# Patient Record
Sex: MALE | Race: WHITE | Employment: OTHER | URBAN - NONMETROPOLITAN AREA
[De-identification: names, ages, dates, MRNs, and addresses within clinical notes are randomized per-mention and may not be internally consistent; named-entity substitution may affect disease eponyms.]

---

## 2020-10-19 ENCOUNTER — APPOINTMENT (OUTPATIENT)
Dept: GENERAL RADIOLOGY | Facility: HOSPITAL | Age: 85
End: 2020-10-19
Attending: EMERGENCY MEDICINE
Payer: COMMERCIAL

## 2020-10-19 ENCOUNTER — HOSPITAL ENCOUNTER (OUTPATIENT)
Facility: HOSPITAL | Age: 85
Setting detail: OBSERVATION
Discharge: NURSING, REHAB OR LONG-TERM CARE | End: 2020-10-20
Attending: EMERGENCY MEDICINE | Admitting: INTERNAL MEDICINE
Payer: COMMERCIAL

## 2020-10-19 DIAGNOSIS — I48.91 ATRIAL FIBRILLATION WITH RVR (H): Primary | ICD-10-CM

## 2020-10-19 DIAGNOSIS — I47.10 SVT (SUPRAVENTRICULAR TACHYCARDIA) (H): ICD-10-CM

## 2020-10-19 LAB
ALBUMIN SERPL-MCNC: 3.4 G/DL (ref 3.4–5)
ALBUMIN UR-MCNC: NEGATIVE MG/DL
ALP SERPL-CCNC: 67 U/L (ref 40–150)
ALT SERPL W P-5'-P-CCNC: 12 U/L (ref 0–70)
ANION GAP SERPL CALCULATED.3IONS-SCNC: 5 MMOL/L (ref 3–14)
APPEARANCE UR: CLEAR
AST SERPL W P-5'-P-CCNC: 15 U/L (ref 0–45)
BASOPHILS # BLD AUTO: 0 10E9/L (ref 0–0.2)
BASOPHILS NFR BLD AUTO: 0.3 %
BILIRUB SERPL-MCNC: 0.4 MG/DL (ref 0.2–1.3)
BILIRUB UR QL STRIP: NEGATIVE
BUN SERPL-MCNC: 36 MG/DL (ref 7–30)
CALCIUM SERPL-MCNC: 8.5 MG/DL (ref 8.5–10.1)
CHLORIDE SERPL-SCNC: 104 MMOL/L (ref 94–109)
CO2 SERPL-SCNC: 30 MMOL/L (ref 20–32)
COLOR UR AUTO: NORMAL
CREAT SERPL-MCNC: 1.58 MG/DL (ref 0.66–1.25)
D DIMER PPP FEU-MCNC: 0.5 UG/ML FEU (ref 0–0.5)
DIFFERENTIAL METHOD BLD: ABNORMAL
EOSINOPHIL # BLD AUTO: 0 10E9/L (ref 0–0.7)
EOSINOPHIL NFR BLD AUTO: 0.3 %
ERYTHROCYTE [DISTWIDTH] IN BLOOD BY AUTOMATED COUNT: 13.3 % (ref 10–15)
GFR SERPL CREATININE-BSD FRML MDRD: 37 ML/MIN/{1.73_M2}
GLUCOSE SERPL-MCNC: 109 MG/DL (ref 70–99)
GLUCOSE UR STRIP-MCNC: NEGATIVE MG/DL
HCT VFR BLD AUTO: 43.7 % (ref 40–53)
HGB BLD-MCNC: 14.3 G/DL (ref 13.3–17.7)
HGB UR QL STRIP: NEGATIVE
IMM GRANULOCYTES # BLD: 0.1 10E9/L (ref 0–0.4)
IMM GRANULOCYTES NFR BLD: 0.5 %
INR PPP: 1.08 (ref 0.86–1.14)
KETONES UR STRIP-MCNC: NEGATIVE MG/DL
LEUKOCYTE ESTERASE UR QL STRIP: NEGATIVE
LYMPHOCYTES # BLD AUTO: 1.4 10E9/L (ref 0.8–5.3)
LYMPHOCYTES NFR BLD AUTO: 11.5 %
MCH RBC QN AUTO: 29.9 PG (ref 26.5–33)
MCHC RBC AUTO-ENTMCNC: 32.7 G/DL (ref 31.5–36.5)
MCV RBC AUTO: 91 FL (ref 78–100)
MONOCYTES # BLD AUTO: 0.7 10E9/L (ref 0–1.3)
MONOCYTES NFR BLD AUTO: 5.7 %
NEUTROPHILS # BLD AUTO: 9.6 10E9/L (ref 1.6–8.3)
NEUTROPHILS NFR BLD AUTO: 81.7 %
NITRATE UR QL: NEGATIVE
NRBC # BLD AUTO: 0 10*3/UL
NRBC BLD AUTO-RTO: 0 /100
NT-PROBNP SERPL-MCNC: 3560 PG/ML (ref 0–1800)
PH UR STRIP: 5.5 PH (ref 4.7–8)
PLATELET # BLD AUTO: 310 10E9/L (ref 150–450)
POTASSIUM SERPL-SCNC: 4.1 MMOL/L (ref 3.4–5.3)
PROT SERPL-MCNC: 6.4 G/DL (ref 6.8–8.8)
RBC # BLD AUTO: 4.78 10E12/L (ref 4.4–5.9)
SODIUM SERPL-SCNC: 139 MMOL/L (ref 133–144)
SOURCE: NORMAL
SP GR UR STRIP: 1.02 (ref 1–1.03)
TROPONIN I SERPL-MCNC: <0.015 UG/L (ref 0–0.04)
TSH SERPL DL<=0.005 MIU/L-ACNC: 3.45 MU/L (ref 0.4–4)
UROBILINOGEN UR STRIP-MCNC: NORMAL MG/DL (ref 0–2)
WBC # BLD AUTO: 11.7 10E9/L (ref 4–11)

## 2020-10-19 PROCEDURE — 85379 FIBRIN DEGRADATION QUANT: CPT | Performed by: EMERGENCY MEDICINE

## 2020-10-19 PROCEDURE — 96376 TX/PRO/DX INJ SAME DRUG ADON: CPT

## 2020-10-19 PROCEDURE — 250N000013 HC RX MED GY IP 250 OP 250 PS 637: Performed by: EMERGENCY MEDICINE

## 2020-10-19 PROCEDURE — 85025 COMPLETE CBC W/AUTO DIFF WBC: CPT | Performed by: EMERGENCY MEDICINE

## 2020-10-19 PROCEDURE — 93005 ELECTROCARDIOGRAM TRACING: CPT

## 2020-10-19 PROCEDURE — 80053 COMPREHEN METABOLIC PANEL: CPT | Performed by: EMERGENCY MEDICINE

## 2020-10-19 PROCEDURE — 99284 EMERGENCY DEPT VISIT MOD MDM: CPT | Performed by: EMERGENCY MEDICINE

## 2020-10-19 PROCEDURE — 71045 X-RAY EXAM CHEST 1 VIEW: CPT

## 2020-10-19 PROCEDURE — 96365 THER/PROPH/DIAG IV INF INIT: CPT

## 2020-10-19 PROCEDURE — 250N000013 HC RX MED GY IP 250 OP 250 PS 637: Performed by: INTERNAL MEDICINE

## 2020-10-19 PROCEDURE — 250N000009 HC RX 250: Performed by: EMERGENCY MEDICINE

## 2020-10-19 PROCEDURE — U0003 INFECTIOUS AGENT DETECTION BY NUCLEIC ACID (DNA OR RNA); SEVERE ACUTE RESPIRATORY SYNDROME CORONAVIRUS 2 (SARS-COV-2) (CORONAVIRUS DISEASE [COVID-19]), AMPLIFIED PROBE TECHNIQUE, MAKING USE OF HIGH THROUGHPUT TECHNOLOGIES AS DESCRIBED BY CMS-2020-01-R: HCPCS | Performed by: EMERGENCY MEDICINE

## 2020-10-19 PROCEDURE — 36415 COLL VENOUS BLD VENIPUNCTURE: CPT | Performed by: EMERGENCY MEDICINE

## 2020-10-19 PROCEDURE — 84443 ASSAY THYROID STIM HORMONE: CPT | Performed by: EMERGENCY MEDICINE

## 2020-10-19 PROCEDURE — 99291 CRITICAL CARE FIRST HOUR: CPT | Mod: 25

## 2020-10-19 PROCEDURE — 99219 PR INITIAL OBSERVATION CARE,LEVEL II: CPT | Mod: 25 | Performed by: INTERNAL MEDICINE

## 2020-10-19 PROCEDURE — 120N000001 HC R&B MED SURG/OB

## 2020-10-19 PROCEDURE — 93010 ELECTROCARDIOGRAM REPORT: CPT | Performed by: INTERNAL MEDICINE

## 2020-10-19 PROCEDURE — 81003 URINALYSIS AUTO W/O SCOPE: CPT | Performed by: INTERNAL MEDICINE

## 2020-10-19 PROCEDURE — 83880 ASSAY OF NATRIURETIC PEPTIDE: CPT | Performed by: EMERGENCY MEDICINE

## 2020-10-19 PROCEDURE — G0378 HOSPITAL OBSERVATION PER HR: HCPCS

## 2020-10-19 PROCEDURE — 85610 PROTHROMBIN TIME: CPT | Performed by: EMERGENCY MEDICINE

## 2020-10-19 PROCEDURE — 258N000003 HC RX IP 258 OP 636: Performed by: EMERGENCY MEDICINE

## 2020-10-19 PROCEDURE — 250N000011 HC RX IP 250 OP 636: Performed by: EMERGENCY MEDICINE

## 2020-10-19 PROCEDURE — C9803 HOPD COVID-19 SPEC COLLECT: HCPCS

## 2020-10-19 PROCEDURE — 84484 ASSAY OF TROPONIN QUANT: CPT | Performed by: EMERGENCY MEDICINE

## 2020-10-19 RX ORDER — LEVOTHYROXINE SODIUM 25 UG/1
25 TABLET ORAL DAILY
COMMUNITY

## 2020-10-19 RX ORDER — LISINOPRIL 10 MG/1
20 TABLET ORAL DAILY
Status: DISCONTINUED | OUTPATIENT
Start: 2020-10-20 | End: 2020-10-20 | Stop reason: HOSPADM

## 2020-10-19 RX ORDER — LEVOTHYROXINE SODIUM 25 UG/1
25 TABLET ORAL DAILY
Status: DISCONTINUED | OUTPATIENT
Start: 2020-10-20 | End: 2020-10-20 | Stop reason: HOSPADM

## 2020-10-19 RX ORDER — ADENOSINE 3 MG/ML
INJECTION, SOLUTION INTRAVENOUS
Status: DISCONTINUED
Start: 2020-10-19 | End: 2020-10-19

## 2020-10-19 RX ORDER — ACETAMINOPHEN 325 MG/1
325 TABLET ORAL EVERY 4 HOURS PRN
COMMUNITY
Start: 2020-05-11

## 2020-10-19 RX ORDER — DILTIAZEM HYDROCHLORIDE 60 MG/1
60 CAPSULE, EXTENDED RELEASE ORAL 2 TIMES DAILY
Status: DISCONTINUED | OUTPATIENT
Start: 2020-10-20 | End: 2020-10-20 | Stop reason: HOSPADM

## 2020-10-19 RX ORDER — ADENOSINE 3 MG/ML
12 INJECTION, SOLUTION INTRAVENOUS ONCE
Status: COMPLETED | OUTPATIENT
Start: 2020-10-19 | End: 2020-10-19

## 2020-10-19 RX ORDER — ONDANSETRON 4 MG/1
4 TABLET, ORALLY DISINTEGRATING ORAL EVERY 6 HOURS PRN
Status: DISCONTINUED | OUTPATIENT
Start: 2020-10-19 | End: 2020-10-20 | Stop reason: HOSPADM

## 2020-10-19 RX ORDER — AMOXICILLIN 250 MG
2 CAPSULE ORAL 2 TIMES DAILY
Status: DISCONTINUED | OUTPATIENT
Start: 2020-10-19 | End: 2020-10-20 | Stop reason: HOSPADM

## 2020-10-19 RX ORDER — ACETAMINOPHEN 325 MG/1
325-650 TABLET ORAL EVERY 4 HOURS PRN
Status: ON HOLD | COMMUNITY
End: 2020-10-19

## 2020-10-19 RX ORDER — DILTIAZEM HYDROCHLORIDE 60 MG/1
60 CAPSULE, EXTENDED RELEASE ORAL 2 TIMES DAILY
Status: DISCONTINUED | OUTPATIENT
Start: 2020-10-19 | End: 2020-10-19

## 2020-10-19 RX ORDER — METOPROLOL SUCCINATE 25 MG/1
25 TABLET, EXTENDED RELEASE ORAL DAILY
Status: ON HOLD | COMMUNITY
End: 2020-10-20

## 2020-10-19 RX ORDER — ONDANSETRON 2 MG/ML
4 INJECTION INTRAMUSCULAR; INTRAVENOUS EVERY 6 HOURS PRN
Status: DISCONTINUED | OUTPATIENT
Start: 2020-10-19 | End: 2020-10-20 | Stop reason: HOSPADM

## 2020-10-19 RX ORDER — LISINOPRIL 20 MG/1
20 TABLET ORAL DAILY
COMMUNITY

## 2020-10-19 RX ORDER — DILTIAZEM HYDROCHLORIDE 5 MG/ML
20 INJECTION INTRAVENOUS ONCE
Status: COMPLETED | OUTPATIENT
Start: 2020-10-19 | End: 2020-10-19

## 2020-10-19 RX ORDER — ACETAMINOPHEN 325 MG/1
325-650 TABLET ORAL EVERY 4 HOURS PRN
Status: DISCONTINUED | OUTPATIENT
Start: 2020-10-19 | End: 2020-10-20 | Stop reason: HOSPADM

## 2020-10-19 RX ORDER — DONEPEZIL HYDROCHLORIDE 5 MG/1
5 TABLET, FILM COATED ORAL AT BEDTIME
COMMUNITY

## 2020-10-19 RX ORDER — NALOXONE HYDROCHLORIDE 0.4 MG/ML
.1-.4 INJECTION, SOLUTION INTRAMUSCULAR; INTRAVENOUS; SUBCUTANEOUS
Status: DISCONTINUED | OUTPATIENT
Start: 2020-10-19 | End: 2020-10-20 | Stop reason: HOSPADM

## 2020-10-19 RX ORDER — DONEPEZIL HYDROCHLORIDE 5 MG/1
5 TABLET, FILM COATED ORAL AT BEDTIME
Status: DISCONTINUED | OUTPATIENT
Start: 2020-10-19 | End: 2020-10-20 | Stop reason: HOSPADM

## 2020-10-19 RX ORDER — SODIUM CHLORIDE 9 MG/ML
INJECTION, SOLUTION INTRAVENOUS CONTINUOUS
Status: DISCONTINUED | OUTPATIENT
Start: 2020-10-19 | End: 2020-10-19

## 2020-10-19 RX ORDER — ADENOSINE 3 MG/ML
6 INJECTION, SOLUTION INTRAVENOUS ONCE
Status: COMPLETED | OUTPATIENT
Start: 2020-10-19 | End: 2020-10-19

## 2020-10-19 RX ORDER — DILTIAZEM HYDROCHLORIDE 30 MG/1
30 TABLET, FILM COATED ORAL ONCE
Status: COMPLETED | OUTPATIENT
Start: 2020-10-20 | End: 2020-10-20

## 2020-10-19 RX ORDER — AMOXICILLIN 250 MG
1 CAPSULE ORAL 2 TIMES DAILY
Status: DISCONTINUED | OUTPATIENT
Start: 2020-10-19 | End: 2020-10-20 | Stop reason: HOSPADM

## 2020-10-19 RX ORDER — FUROSEMIDE 40 MG
40 TABLET ORAL DAILY
COMMUNITY

## 2020-10-19 RX ADMIN — SODIUM CHLORIDE: 9 INJECTION, SOLUTION INTRAVENOUS at 14:44

## 2020-10-19 RX ADMIN — SENNOSIDES AND DOCUSATE SODIUM 1 TABLET: 8.6; 5 TABLET ORAL at 21:15

## 2020-10-19 RX ADMIN — ACETAMINOPHEN 650 MG: 325 TABLET, FILM COATED ORAL at 23:22

## 2020-10-19 RX ADMIN — DILTIAZEM HYDROCHLORIDE 60 MG: 60 CAPSULE, EXTENDED RELEASE ORAL at 16:36

## 2020-10-19 RX ADMIN — DILTIAZEM HYDROCHLORIDE 5 MG/HR: 5 INJECTION INTRAVENOUS at 15:04

## 2020-10-19 RX ADMIN — DONEPEZIL HYDROCHLORIDE 5 MG: 5 TABLET, FILM COATED ORAL at 21:15

## 2020-10-19 RX ADMIN — ADENOSINE 6 MG: 3 INJECTION INTRAVENOUS at 14:36

## 2020-10-19 RX ADMIN — ADENOSINE 12 MG: 3 INJECTION, SOLUTION INTRAVENOUS at 14:42

## 2020-10-19 RX ADMIN — DILTIAZEM HYDROCHLORIDE 20 MG: 5 INJECTION INTRAVENOUS at 14:57

## 2020-10-19 ASSESSMENT — ENCOUNTER SYMPTOMS
ABDOMINAL PAIN: 0
FEVER: 0
SHORTNESS OF BREATH: 0

## 2020-10-19 NOTE — ED NOTES
Monitor has shown NSR with PACs since institution of his Cardizem gtt. This has been DCd and he remains in sinus rhythm rate 70s-80s. Transfer to Acute care in stable condition

## 2020-10-19 NOTE — ED NOTES
HR increases to 160's SVT and then slows with valsalva or on its own. Dr. Vincent updated. Orders obtained. Awaiting cardizem drip from Pharm. O2 sats decreased to 87-89%. O2 on at 2 l NC per order Dr. Vincent.

## 2020-10-19 NOTE — PLAN OF CARE
[facility] Inpatient Admission Note:    Patient admitted to 3216/3216-1 at approximately 5:45 PM via bed accompanied by transport tech from emergency room . Report received from Blanca HUFF in SBAR format at 5:08 PM via telephone. Patient ambulated to bed via self.. Patient is alert and oriented X 1, denies pain.  Patient oriented to room, unit, hourly rounding, and plan of care. Explained admission packet and patient handbook with patient bill of rights brochure. Will continue to monitor and document as needed.     Inpatient Nursing criteria listed below was met:    Health care directives status obtained and documented: Yes    Care Everywhere authorization obtained No    MRSA swab completed for patient 65 years and older: N/A    Patient identifies a surrogate decision maker: Yes If yes, who: Dain Daughter -  Contact Information: [phone]     If initial lactic acid >2.0, repeat lactic acid drawn within one hour of arrival to unit: NA    Vaccination assessment and education completed: Yes   Vaccinations received prior to admission: Pneumovax no  Influenza(seasonal)  NO   Vaccination(s) ordered: influenza vaccine    Clergy visit ordered if patient requests: No - patient declined    Skin issues/needs documented: Yes    Isolation Patient: no Education given, correct sign in place and documentation row added to PCS:  No    Fall Prevention Yes: Care plan updated, education given and documented, sticker and magnet in place: Yes    Care Plan initiated: Yes    Education Documented (including assessment): Yes    Patient has discharge needs : No

## 2020-10-19 NOTE — ED NOTES
"Patient's daughter \"Dain\" called and updated re; patient ER stay and admission.  Dain v/u and thanked staffed for calling.  This RN attempted to call the nursing home and no one answered; generic message left.  "

## 2020-10-19 NOTE — LETTER
[facility]  [address]  [place] [address] [phone]    FACSIMILE TRANSMITTAL SHEET    TO: Tucker MACARIO: Christoval Jones  FAX NUMBER: [phone]  PHONE NUMBER:      FROM: Miladys HUFF  PHONE: [phone]  DATE: 10/22/20  NUMBER OF PAGES:     _____URGENT ___X__REVIEW ONLY _____PLEASE COMMENT____PLEASE REPLY    NOTES/COMMENTS: COVID results                                      IF YOU DID NOT RECEIVE THE CORRECT NUMBER OF PAGES OR THE FAX DID NOT COME THROUGH CLEARLY, PLEASE CALL THE SENDER     CONFIDENTIALITY STATEMENT: Confidential information that may accompany this transmission contains protected health information under state and federal law and is legally privileged. This information is intended only for the use of the individual or entity named above and may be used only for carrying out treatment, payment or other healthcare operations. The recipient or person responsible for delivering this information is prohibited by law from disclosing this information without proper authorization to any other party, unless required to do so by law or regulation. If you are not the intended recipient, you are hereby notified that any review, dissemination, distribution, or copying of this message is strictly prohibited. If you have received this communication in error, please destroy the materials and contact us immediately by calling the number listed above. No response indicates that the information was received by the appropriate authorized party

## 2020-10-19 NOTE — ED PROVIDER NOTES
History     Chief Complaint   Patient presents with     Tachycardia     Loss of Consciousness     HPI  Issac Sanchez is a 89 year old male who presented to the emergency department from nursing home with 2 episodes of syncope and noted tachycardia.  The nursing home staff noted that patient's heart rate was ranging from 92 150 bpm and then going back down to 90 bpm.  Patient was feeling dizzy and fainted.  Denied any chest pain or shortness of breath.  He denies any chronic medical issues and is not on any medications.    Allergies:  No Known Allergies    Problem List:    Patient Active Problem List    Diagnosis Date Noted     SVT (supraventricular tachycardia) (H) 10/19/2020     Priority: Medium     Atrial fibrillation with RVR (H) 10/19/2020     Priority: Medium        Past Medical History:    No past medical history on file.    Past Surgical History:    No past surgical history on file.    Family History:    No family history on file.    Social History:  Marital Status:    Social History     Tobacco Use     Smoking status: Not on file   Substance Use Topics     Alcohol use: Not on file     Drug use: Not on file        Medications:    No current outpatient medications on file.        Review of Systems   Constitutional: Negative for fever.   Respiratory: Negative for shortness of breath.    Cardiovascular: Negative for chest pain.   Gastrointestinal: Negative for abdominal pain.   All other systems reviewed and are negative.      Physical Exam   BP: 101/72  Pulse: 98  Temp: 98.9  F (37.2  C)  Resp: 20  SpO2: 96 %      Physical Exam  Vitals signs and nursing note reviewed.   Constitutional:       General: He is not in acute distress.     Appearance: He is well-developed. He is not diaphoretic.   HENT:      Head: Normocephalic and atraumatic.   Cardiovascular:      Rate and Rhythm: Regular rhythm. Tachycardia present.      Heart sounds: Normal heart sounds.   Pulmonary:      Effort: Pulmonary effort is normal. No  respiratory distress.      Breath sounds: Normal breath sounds. No stridor.   Abdominal:      General: Bowel sounds are normal. There is no distension.      Tenderness: There is no abdominal tenderness.   Musculoskeletal:         General: No tenderness or deformity.   Neurological:      Mental Status: He is alert.         ED Course   Patient evaluated and labs ordered.  Adenosine 6 mg IV given, followed by 12 mg IV when there was no response.  Patient continues to still have SVTs but EKG showed A. fib with RVR at 161 bpm.  Started on Cardizem bolus 20 mg followed by infusion.    After Cardizem bolus, converted to sinus rhythm with PVCs at 80 bpm.  Repeat EKG at 3:15 PM-sinus rhythm with PVCs at 80 bpm    Procedures       EKG Interpretation:      Interpreted by Cosme Vincent MD  Time reviewed: 2:40 PM  Symptoms at time of EKG: Syncopal episode  Rhythm: A. fib with RVR  Rate: 161 bpm  Axis: normal  Ectopy: none  Conduction: normal  ST Segments/ T Waves: No ST-T wave changes  Q Waves: none  Comparison to prior: No old EKG available    Clinical Impression: A. fib with RVR at 161 bpm      Results for orders placed or performed during the hospital encounter of 10/19/20 (from the past 24 hour(s))   CBC with platelets differential   Result Value Ref Range    WBC 11.7 (H) 4.0 - 11.0 10e9/L    RBC Count 4.78 4.4 - 5.9 10e12/L    Hemoglobin 14.3 13.3 - 17.7 g/dL    Hematocrit 43.7 40.0 - 53.0 %    MCV 91 78 - 100 fl    MCH 29.9 26.5 - 33.0 pg    MCHC 32.7 31.5 - 36.5 g/dL    RDW 13.3 10.0 - 15.0 %    Platelet Count 310 150 - 450 10e9/L    Diff Method Automated Method     % Neutrophils 81.7 %    % Lymphocytes 11.5 %    % Monocytes 5.7 %    % Eosinophils 0.3 %    % Basophils 0.3 %    % Immature Granulocytes 0.5 %    Nucleated RBCs 0 0 /100    Absolute Neutrophil 9.6 (H) 1.6 - 8.3 10e9/L    Absolute Lymphocytes 1.4 0.8 - 5.3 10e9/L    Absolute Monocytes 0.7 0.0 - 1.3 10e9/L    Absolute Eosinophils 0.0 0.0 - 0.7 10e9/L     Absolute Basophils 0.0 0.0 - 0.2 10e9/L    Abs Immature Granulocytes 0.1 0 - 0.4 10e9/L    Absolute Nucleated RBC 0.0    Comprehensive metabolic panel   Result Value Ref Range    Sodium 139 133 - 144 mmol/L    Potassium 4.1 3.4 - 5.3 mmol/L    Chloride 104 94 - 109 mmol/L    Carbon Dioxide 30 20 - 32 mmol/L    Anion Gap 5 3 - 14 mmol/L    Glucose 109 (H) 70 - 99 mg/dL    Urea Nitrogen 36 (H) 7 - 30 mg/dL    Creatinine 1.58 (H) 0.66 - 1.25 mg/dL    GFR Estimate 37 (L) >60 mL/min/[1.73_m2]    GFR Estimate If Black 43 (L) >60 mL/min/[1.73_m2]    Calcium 8.5 8.5 - 10.1 mg/dL    Bilirubin Total 0.4 0.2 - 1.3 mg/dL    Albumin 3.4 3.4 - 5.0 g/dL    Protein Total 6.4 (L) 6.8 - 8.8 g/dL    Alkaline Phosphatase 67 40 - 150 U/L    ALT 12 0 - 70 U/L    AST 15 0 - 45 U/L   D dimer quantitative   Result Value Ref Range    D Dimer 0.5 0.0 - 0.50 ug/ml FEU   INR   Result Value Ref Range    INR 1.08 0.86 - 1.14   Nt probnp inpatient (BNP)   Result Value Ref Range    N-Terminal Pro BNP Inpatient 3,560 (H) 0 - 1,800 pg/mL   Troponin I   Result Value Ref Range    Troponin I ES <0.015 0.000 - 0.045 ug/L   TSH with free T4 reflex   Result Value Ref Range    TSH 3.45 0.40 - 4.00 mU/L   XR Chest Port 1 View    Narrative    PROCEDURE:  XR CHEST PORT 1 VW    HISTORY:  Shortness of breath with SVT.     COMPARISON:  None.    FINDINGS:   The cardiac silhouette is normal in size. The pulmonary vasculature is  normal.  There is abnormal increased density in the left lung base  consistent with atelectasis or pneumonia. Some minimal linear opacity  at the right lung base representing atelectasis or scarring No pleural  effusion or pneumothorax.      Impression    IMPRESSION:  Left basilar opacity consistent with atelectasis or  pneumonia      THELMA JACKSON MD       Medications   diltiazem ER (CARDIZEM SR) 12 hr capsule 60 mg (60 mg Oral Not Given 10/19/20 1918)   acetaminophen (TYLENOL) tablet 325-650 mg (has no administration in time range)    donepezil (ARICEPT) tablet 5 mg (has no administration in time range)   furosemide (LASIX) tablet 60 mg (has no administration in time range)   levothyroxine (SYNTHROID/LEVOTHROID) tablet 25 mcg (has no administration in time range)   lisinopril (ZESTRIL) tablet 20 mg (has no administration in time range)   naloxone (NARCAN) injection 0.1-0.4 mg (has no administration in time range)   melatonin tablet 1 mg (has no administration in time range)   ondansetron (ZOFRAN-ODT) ODT tab 4 mg (has no administration in time range)     Or   ondansetron (ZOFRAN) injection 4 mg (has no administration in time range)   senna-docusate (SENOKOT-S/PERICOLACE) 8.6-50 MG per tablet 1 tablet (has no administration in time range)     Or   senna-docusate (SENOKOT-S/PERICOLACE) 8.6-50 MG per tablet 2 tablet (has no administration in time range)   influenza vac high-dose quad (FLUZONE HD) injection JEZ 0.7 mL (has no administration in time range)   adenosine (ADENOCARD) injection 6 mg (6 mg Intravenous Given 10/19/20 1436)   adenosine (ADENOCARD) injection 12 mg (12 mg Intravenous Given 10/19/20 1442)   diltiazem (CARDIZEM) injection 20 mg (20 mg Intravenous Given 10/19/20 1457)       Assessments & Plan (with Medical Decision Making)   A. fib with RVR: Presented to the ED after syncopal episode at the [facility] living where he is a resident.  Initial exam on the monitor showed SVT and patient received adenosine 6 and 12 mg IV without conversion.  EKG done showed atrial fibrillation with RVR.  A. fib with RVR: Patient received IV Cardizem 20 mg and converted 80 bpm from a high of 170 bpm.  The heart rate started creeping up above 100 upon which she was started on Cardizem drip.  Discussed with hospitalist on-call Dr. Haq who recommended that patient be started on oral Cardizem and be admitted observation.  Cardizem drip discontinued.  Admitted under care of Dr. Haq.    I have reviewed the nursing notes.    I have reviewed the  findings, diagnosis, plan and need for follow up with the patient.    Current Discharge Medication List          Final diagnoses:   SVT (supraventricular tachycardia) (H)   Atrial fibrillation with RVR (H)       10/19/2020   [facility]     Cosme Vincent MD  10/1935

## 2020-10-19 NOTE — ED NOTES
No response to Adenosine 6 mg IV with 20 ml NS flush. Dr. Vincent at bedside. Pt's rate increases and will decrease with cough/valsalva or just spontaneously. Pt denies Chest pain or feeling faint.

## 2020-10-19 NOTE — H&P
[facility]    History and Physical - Hospitalist Service       Date of Admission:  10/19/2020    Assessment & Plan   Issac Sanchez is a 89 year old male admitted on 10/19/2020.     AFIB with RVR: this was converted to NS with Cardizem gtt. Will transition to PO Diltiazem and discontinue his prior-prescribed Metoprolol    Acute on chronic diastolic CHF: will increase his Lasix dosing to 60 mg daily from 40 mg, given the elevated BNP and the possible fluid overload contribution to AFIB. The patient has low stage 3 renal performance and 40 mg daily of Lasix may not have been adequate.    Anticoagulation: given his age, will not start anticoagulation    General: check UA     Diet:   regular  DVT Prophylaxis: Pneumatic Compression Devices  Wayne Catheter: not present  Code Status:   DNR         Disposition Plan   Expected discharge: Tomorrow, recommended to prior living arrangement once HR is stable.  Entered: Octavio House DO 10/19/2020, 5:25 PM     The patient's care was discussed with the Primary team.    Octavio House DO  [facility]  Contact information available via [facility] Paging/Directory      ______________________________________________________________________    Chief Complaint   Near syncope, variable HR    History is obtained from the SNF staff and the ER Provider; the patient cannot contribute to the HPI because of dementia    History of Present Illness   Issac Sanchez is a 89 year old male who has dementia and who resides in a SNF. His recent ECHO shows grade 2 diastolic failure (chronic diastolic CHF).    He had been in his typical state of health and then today, he demonstrated near syncope and was weak. Vitals obtained showed irregular HR going between the 90's and rapid tachycardia    ER Course: vitals and EKG showed AFIB with RVR (). He was given Adenosine which revealed AFIB. A Cardizem bolus followed by gtt was administered with prompt conversion to NS with HR in  the 70's. Lab diagnostics resulted a heme profile with slightly elevated WBC (11.7) with slight left shift. Chemistry profile showed low stage 3 renal failure, recent ECHO report showed grade 2 diastolic failure with preserved EF. BNP was elevated (3560) and Troponin was normal as was TSH. As noted, he converted to NS shortly after receiving the Cardizem treatments.    Review of Systems  this is simplified due to the patients cognition limitations    Constitutional: No fever or chills, felt weak at the SNF, good appetite  Ears, Nose & Throat: no sore throat, no nasal drainage, no congestion.  Eyes: no particular change in vision, no redness, no drainage  Cardiovascular: No chest pain  Pulmonary: No cough, no shortness of breath  Gastrointestinal: No abdominal pain, no nausea, no vomiting, no diarrhea.   Genitourinary: No particular complaints; he has been urinating as usual  Skin: No particular change in bruising, no rashes  Musculoskeletal: no particular change in strength, no particular change in muscle development  Neurological: no numbness and tingling, no headache, no particular change in balance, no dizziness  Psychologic: No feelings of sadness or anxiety  Endocrine: No particular change in heat or cold intolerance           Past Medical History    I have reviewed this patient's medical history and updated it with pertinent information if needed.   No past medical history on file.    Past Surgical History   I have reviewed this patient's surgical history and updated it with pertinent information if needed.  No past surgical history on file.    Social History   I have reviewed this patient's social history and updated it with pertinent information if needed.  Social History     Tobacco Use     Smoking status: Not on file   Substance Use Topics     Alcohol use: Not on file     Drug use: Not on file       Family History         Prior to Admission Medications   Prior to Admission Medications   Prescriptions Last  "Dose Informant Patient Reported? Taking?   acetaminophen (TYLENOL) 325 MG tablet   Yes Yes   Sig: Take 325-650 mg by mouth every 4 hours as needed for mild pain   donepezil (ARICEPT) 5 MG tablet 10/18/2020 at Unknown time  Yes Yes   Sig: Take 5 mg by mouth At Bedtime   furosemide (LASIX) 40 MG tablet 10/19/2020 at Unknown time  Yes Yes   Sig: Take 40 mg by mouth daily   levothyroxine (SYNTHROID/LEVOTHROID) 25 MCG tablet 10/19/2020 at Unknown time  Yes Yes   Sig: Take 25 mcg by mouth daily   lisinopril (ZESTRIL) 20 MG tablet 10/19/2020 at Unknown time  Yes Yes   Sig: Take 20 mg by mouth daily   metoprolol succinate ER (TOPROL-XL) 25 MG 24 hr tablet 10/19/2020 at Unknown time  Yes Yes   Sig: Take 25 mg by mouth daily      Facility-Administered Medications: None     Allergies   No Known Allergies    Physical Exam   Vital Signs: Temp: 98  F (36.7  C) Temp src: Oral BP: 147/71 Pulse: 72   Resp: 24 SpO2: 96 % O2 Device: None (Room air)    Weight: 0 lbs 0 oz     Case reviewed with the ER Provider, EHR reviewed; patient seen in ER room 2    Vital signs:  Temp: 98  F (36.7  C) Temp src: Oral BP: 147/71 Pulse: 72   Resp: 24 SpO2: 96 % O2 Device: None (Room air)        There is no height or weight on file to calculate BMI.      General: No distress, interactive, \"I feel pretty good\"  Head: normocephalic, no obvious trauma  Eyes: Gaze directed normally, sclera clear, no discharge, no abnormal ocular movements  Ears: Normal appearing age-related external ears, no discharge, stable hearing acuity loss  Nose: Normal age-related appearance  Mouth: Normal appearing oral mucosa, Gums and throat appear age-related normal  Neck: Normal age-related appearance, age-related range of motion, supple, no adenopathy  Pulmonary: Normal work of breathing, no expiratory delay, no coarseness, no wheezing  Cardiovascular: Distant heart sounds, regular rhythm   Abdomen: No obvious distention, soft, bowel sounds present with normal frequency and " pitch  Rectal: Deferred  Back: Age-related normal  Skin: Age-related normal, no rashes  Extremities: Not tender, no lower extremity edema. Moving upper and lower extremities  Neurological: Grossly in tact  Psychiatric: Mood is stable, appropriately interactive          Data   Data reviewed today: I reviewed all medications, new labs and imaging results over the last 24 hours.

## 2020-10-19 NOTE — ED NOTES
HR increased to 160's, valsalva decreases to 80's SR with PACs. MD aware. Awaiting cardizem drip. Pharm called.

## 2020-10-20 VITALS
HEART RATE: 68 BPM | OXYGEN SATURATION: 96 % | DIASTOLIC BLOOD PRESSURE: 67 MMHG | TEMPERATURE: 98 F | SYSTOLIC BLOOD PRESSURE: 149 MMHG | RESPIRATION RATE: 20 BRPM

## 2020-10-20 PROBLEM — R55 SYNCOPE: Status: ACTIVE | Noted: 2020-10-20

## 2020-10-20 PROBLEM — R55 SYNCOPE, UNSPECIFIED SYNCOPE TYPE: Status: ACTIVE | Noted: 2020-10-20

## 2020-10-20 LAB
ANION GAP SERPL CALCULATED.3IONS-SCNC: 6 MMOL/L (ref 3–14)
BUN SERPL-MCNC: 36 MG/DL (ref 7–30)
CALCIUM SERPL-MCNC: 8.1 MG/DL (ref 8.5–10.1)
CHLORIDE SERPL-SCNC: 109 MMOL/L (ref 94–109)
CO2 SERPL-SCNC: 27 MMOL/L (ref 20–32)
CREAT SERPL-MCNC: 1.3 MG/DL (ref 0.66–1.25)
ERYTHROCYTE [DISTWIDTH] IN BLOOD BY AUTOMATED COUNT: 13.2 % (ref 10–15)
GFR SERPL CREATININE-BSD FRML MDRD: 47 ML/MIN/{1.73_M2}
GLUCOSE SERPL-MCNC: 99 MG/DL (ref 70–99)
HCT VFR BLD AUTO: 38.6 % (ref 40–53)
HGB BLD-MCNC: 12.9 G/DL (ref 13.3–17.7)
MCH RBC QN AUTO: 30.2 PG (ref 26.5–33)
MCHC RBC AUTO-ENTMCNC: 33.4 G/DL (ref 31.5–36.5)
MCV RBC AUTO: 90 FL (ref 78–100)
PLATELET # BLD AUTO: 241 10E9/L (ref 150–450)
POTASSIUM SERPL-SCNC: 3.8 MMOL/L (ref 3.4–5.3)
RBC # BLD AUTO: 4.27 10E12/L (ref 4.4–5.9)
SODIUM SERPL-SCNC: 142 MMOL/L (ref 133–144)
WBC # BLD AUTO: 9.6 10E9/L (ref 4–11)

## 2020-10-20 PROCEDURE — 36415 COLL VENOUS BLD VENIPUNCTURE: CPT | Performed by: INTERNAL MEDICINE

## 2020-10-20 PROCEDURE — G0378 HOSPITAL OBSERVATION PER HR: HCPCS

## 2020-10-20 PROCEDURE — 85027 COMPLETE CBC AUTOMATED: CPT | Performed by: INTERNAL MEDICINE

## 2020-10-20 PROCEDURE — 99217 PR OBSERVATION CARE DISCHARGE: CPT | Performed by: INTERNAL MEDICINE

## 2020-10-20 PROCEDURE — 80048 BASIC METABOLIC PNL TOTAL CA: CPT | Performed by: INTERNAL MEDICINE

## 2020-10-20 PROCEDURE — G0008 ADMIN INFLUENZA VIRUS VAC: HCPCS | Performed by: INTERNAL MEDICINE

## 2020-10-20 PROCEDURE — 250N000013 HC RX MED GY IP 250 OP 250 PS 637: Performed by: INTERNAL MEDICINE

## 2020-10-20 PROCEDURE — 90662 IIV NO PRSV INCREASED AG IM: CPT | Performed by: INTERNAL MEDICINE

## 2020-10-20 PROCEDURE — 250N000011 HC RX IP 250 OP 636: Performed by: INTERNAL MEDICINE

## 2020-10-20 RX ORDER — DILTIAZEM HYDROCHLORIDE 60 MG/1
60 CAPSULE, EXTENDED RELEASE ORAL 2 TIMES DAILY
Qty: 60 CAPSULE | Refills: 3 | Status: SHIPPED | OUTPATIENT
Start: 2020-10-20

## 2020-10-20 RX ADMIN — FUROSEMIDE 60 MG: 40 TABLET ORAL at 08:32

## 2020-10-20 RX ADMIN — DILTIAZEM HYDROCHLORIDE 60 MG: 60 CAPSULE, EXTENDED RELEASE ORAL at 08:31

## 2020-10-20 RX ADMIN — SENNOSIDES AND DOCUSATE SODIUM 1 TABLET: 8.6; 5 TABLET ORAL at 08:32

## 2020-10-20 RX ADMIN — INFLUENZA A VIRUS A/MICHIGAN/45/2015 X-275 (H1N1) ANTIGEN (FORMALDEHYDE INACTIVATED), INFLUENZA A VIRUS A/SINGAPORE/INFIMH-16-0019/2016 IVR-186 (H3N2) ANTIGEN (FORMALDEHYDE INACTIVATED), INFLUENZA B VIRUS B/PHUKET/3073/2013 ANTIGEN (FORMALDEHYDE INACTIVATED), AND INFLUENZA B VIRUS B/MARYLAND/15/2016 BX-69A ANTIGEN (FORMALDEHYDE INACTIVATED) 0.7 ML: 60; 60; 60; 60 INJECTION, SUSPENSION INTRAMUSCULAR at 12:20

## 2020-10-20 RX ADMIN — LEVOTHYROXINE SODIUM 25 MCG: 0.03 TABLET ORAL at 05:56

## 2020-10-20 RX ADMIN — DILTIAZEM HYDROCHLORIDE 30 MG: 30 TABLET, FILM COATED ORAL at 04:57

## 2020-10-20 RX ADMIN — LISINOPRIL 20 MG: 10 TABLET ORAL at 08:31

## 2020-10-20 NOTE — PLAN OF CARE
We were unable to arrange hospital follow up appointment for patient, message left with Tucker HUFF at [facility] to arrange this appointment.

## 2020-10-20 NOTE — PLAN OF CARE
VSS. HR irregular Per ICU tele report SR with frequent PAC. Lung sounds coarse et rhonchi throughout sats 98% on room air. Confused to place/time/situation, thought he was in a crack house. Alarms audible et active. Slept throughout noc.

## 2020-10-20 NOTE — PLAN OF CARE
Prior to Admission Medication Reconciliation:     Patient discharged before completion. MAR from SNF came after pt left.   Elo Alonso on 10/23/2020 at 2:11 PM    Medication reconciliation sources:   []Patient  []Patient family member/emergency contact: **  [][facility] Report Review  []Epic Chart Review  [x]Care Everywhere review:  Medication Sig Dispensed Refills Start Date End Date Status   levothyroxine (SYNTHROID) 25 MCG tablet    Indications: Hypothyroidism, unspecified type TAKE 1 TAB BY MOUTH ONE TIME A DAY. 90 Tab   3 01/12/2020   Active   acetaminophen (TYLENOL) 325 MG tablet    Indications: Acute right ankle pain Take 1 Tab by mouth every six hours as needed for Pain . Limit acetaminophen to 4000 mg per day from all sources. 90 Tab   0 05/11/2020   Active   furosemide (Lasix) 40 MG tablet    Indications: Diastolic dysfunction with chronic heart failure (HCC) Take 1 Tab by mouth one time a day. 90 Tab   3 09/10/2020   Active   lisinopril (Prinivil, Zestril) 20 MG tablet    Indications: Uncontrolled hypertension Take 1 Tab by mouth one time a day. 90 Tab   3 09/10/2020   Active   metoprolol succinate (Toprol-XL) 25 MG 24 hour extended-release tablet    Indications: Uncontrolled hypertension Take 1 Tab by mouth one time a day. Do not crush or chew. 90 Tab   3 09/10/2020   Active   donepezil (Aricept) 5 MG tablet    Indications: Memory loss Take 1 Tab by mouth at bedtime. 90 Tab   0 10/16/2020   Active       []Pharmacy med list: **   Name Strength Instructions Last Fill Date QTY/DS Notes   []         []         []         []         []         []         []         []         []         []         []         []         []         []         []         []         []         []         []         []         []         []         []         []            []Pharmacy phone call  []Outside meds dispense report  []Nursing home or Assisted Living MAR:  []Other: **    Pharmacy desired at discharge:    Is patient on  coumadin?  []No  []Yes:     INR result:    INR date:    Verified by facility:    Coumadin strength/instructions:    Requests for consultation by provider or pharmacist:   [] Patient understands why all of their meds were prescribed and how to take them. No questions.   [] Fill dates coincide with compliancy for all maintenance meds.   [] Fill dates do not coincide with compliancy with maintenance meds. See notes in PTA medlist about how patient is taking.   [] Patient has questions about the following:        Comments:           Elo Alonso on 10/20/2020 at 8:48 AM       Discrepancies: [] No []Not Applicable []Yes: listed below        Time spent on medication reconciliation:   []5-20 mins  []20-40 mins  []> 40 mins    Issues completing PTA medication reconciliation:  [] On hold for a long time  [] Waited for a call back  [] Fax didn't come through  [] Fax took a long time  [] Other:    Notifying appropriate party of changes/additions/discrepancies:  []No pertinent changes made, notification not necessary.   [] Notified attending provider via text page  [] Notified attending provider in person  [] Notified pharmacy  [] Notified nurse  [] Attending provider not available, left detailed notes  [] Changes/additions made don't need provider notification because provider has not seen patient or input any orders  [] Changes/additions made don't need provider notification because changes made are to medications not ordered

## 2020-10-20 NOTE — PLAN OF CARE
Nurse to nurse report given to Tucker HUFF with [facility] assisted living facility in [facility], they do anticipate patients return to their facility today.

## 2020-10-20 NOTE — PROGRESS NOTES
Assessment completed with daughter Dain.    LOC: patient did not participate in the assessment due to cognition.    Dx: A-Fib, SVT  Chronic Disease Management: dementia    Lives with: is a resident at [facility]  Living at:  [facility] in [place]  Transportation: YES, usually daughter transports, today with [facility], daughter advised that he will be billed.    Primary PCP: Cameron Anderson  Insurance:  [employer] Medicare  Medicare: MOON letter reviewed with Dain.    Support System:  Family and staff at [facility]  Homecare/PCA: no  /County Services:   no  [place]: YES      How was the [facility] notification completed: yes    Health Care Directive: daughter thinks he has one  Guardian: no  POA: no    Pharmacy: [facility]  Meds management: Staff    Adequate Resources for needs (housing, utilities, food/med): YES  Household chores: staff  Work/community/social activity: No    ADLs: independent  Ambulation:uses a walker  Falls: most recently walking to fast with his walker and fell  Nutrition: no concern  Sleep: sleeps well    Equipment used: walker      Oxygen supplier: no       Does the supplier have valid oxygen orders: n/a    Mental health: denies  Substance abuse: denies  Exposure to violence/abuse: not asked  Stressors: not asked    Able to Return to Prior Living Arrangements: YES    Choice of Vendor: n/a    Barriers: unknown    MEKA: low    Plan: back to [facility] via [facility]. Daughter Dain notified.

## 2020-10-20 NOTE — UTILIZATION REVIEW
"Admission Status; Secondary Review Determination     Admission Date: 10/19/2020  2:21 PM      Under the authority of the Utilization Management Committee, the utilization review process indicated a secondary review on the above patient.  The review outcome is based on review of the medical records, discussions with staff, and applying clinical experience noted on the date of the review.          (x) Observation Status Appropriate - This patient does not meet hospital inpatient criteria and is placed in observation status. If this patient's primary payer is Medicare and was admitted as an inpatient, Condition Code 44 should be used and patient status changed to \"observation\".     RATIONALE FOR DETERMINATION   89 year old male who presented to the emergency department from nursing home with 2 episodes of syncope and noted tachycardia.  He was found to be in atrial fibrillation and started on Cardizem.  He converted to NSR on Cardizem.  He will have medication adjustment for A fib and CHF.  Anticipate discharge today.  .   The severity of illness, intensity of service provided, expected LOS and risk for adverse outcome make the care appropriate for further observation; however, doesn't meet criteria for hospital inpatient admission. This was discussed with attending physician who concurred with this determination.        The information on this document is developed by the utilization review team in order for the business office to ensure compliance.  This only denotes the appropriateness of proper admission status and does not reflect the quality of care rendered.         The definitions of Inpatient Status and Observation Status used in making the determination above are those provided in the CMS Coverage Manual, Chapter 1 and Chapter 6, section 70.4.      Sincerely,     Rebecca Duff MD  Physician Advisor  [facility]    "

## 2020-10-20 NOTE — PLAN OF CARE
Patient was mera to ambulate with assist of two staff using the walker and transfer belt, patient able to ambulate well heart rate did not jump up, vitals taken after the walk, vitals as charted.

## 2020-10-20 NOTE — DISCHARGE SUMMARY
[facility]  Hospitalist Discharge Summary      Date of Admission:  10/19/2020  Date of Discharge:  10/20/2020  Discharging Provider: Joon Haq MD      Discharge Diagnoses   Syncope  Atrial fibrillation rapid ventricular response transient  Dementia    Follow-ups Needed After Discharge   Follow-up Appointments     Follow-up and recommended labs and tests       Follow up with primary care provider, PAUL NEWELL, within 7 days for   hospital follow- up.  No follow up labs or test are needed.  He is   evaluation of heart rate blood pressure.  Could easily go up on the   diltiazem dosing.         None    Unresulted Labs Ordered in the Past 30 Days of this Admission     Date and Time Order Name Status Description    10/19/2020 1657 Asymptomatic COVID-19 Virus (Coronavirus) by PCR In process       These results will be followed up by PCP    Discharge Disposition   Discharged to assisted living  Condition at discharge: Stable    Hospital Course   Patient is a 89-year-old gentleman resident of assisted living.  Having some issues with his memory and probable worsening dementia.  History of hypertension.  Had an echocardiogram done within the last year and a half showing normal systolic function.  He apparently suffered a couple of episodes of syncope while at the assisted living facility.  Was brought to the ER for evaluation.  Was found to be quite tachycardic initially the thought was perhaps SVT.  Blood pressure was stable.  He was given 2 doses of adenosine with no real effect and then was found to be A. fib with RVR.  Was given a dose of diltiazem intravenously followed by drip rate came down to in the 60-80 range.  And then quickly resolved to sinus rhythm.  Troponins were negative.  Electrolytes within normal limits.  Given his episode of syncope was admitted overnight kept on telemetry and remained in sinus rhythm.  Alert pleasant somewhat confused.  Blood pressure stable no fevers room air sats  96%.  He was placed on oral diltiazem ER 60 mg twice a day.  He had been on 25 mg of Toprol and that was discontinued.  Do not feel he is a candidate for anticoagulation.    He ambulated with stable heart rate remaining in sinus rhythm good blood pressure asymptomatic.  Warden that he was safe to be discharged back to assisted living.  Certainly his diltiazem dose can be increased if necessary.    Consultations This Hospital Stay   None    Code Status   No CPR- Do NOT Intubate    Time Spent on this Encounter   I, Joon Haq MD, personally saw the patient today and spent greater than 30 minutes discharging this patient.       Joon Haq MD  [facility]  [address]  [facility] [address]  Phone: [phone]  Fax: [phone]  ______________________________________________________________________    Physical Exam   Vital Signs: Temp: 97.4  F (36.3  C) Temp src: Temporal BP: 150/58 Pulse: 73   Resp: 20 SpO2: 96 % O2 Device: None (Room air)    Weight: 0 lbs 0 oz  Constitutional: awake, alert, cooperative, no apparent distress, and appears stated age  Respiratory: No increased work of breathing, good air exchange, clear to auscultation bilaterally, no crackles or wheezing  Cardiovascular: Normal apical impulse, regular rate and rhythm, normal S1 and S2, no S3 or S4, and no murmur noted  GI: No scars, normal bowel sounds, soft, non-distended, non-tender, no masses palpated, no hepatosplenomegally  Musculoskeletal: There is no redness, warmth, or swelling of the joints.  Full range of motion noted.  Motor strength is 5 out of 5 all extremities bilaterally.  Tone is normal.       Primary Care Physician   PAUL NEWELL    Discharge Orders      Reason for your hospital stay    Patient admitted after having a couple of syncopal episodes.  Was found to have atrial fibrillation with rapid ventricular response.     Follow-up and recommended labs and tests     Follow up with primary care provider, PAUL SANCHEZ  REECE, within 7 days for hospital follow- up.  No follow up labs or test are needed.  He is evaluation of heart rate blood pressure.  Could easily go up on the diltiazem dosing.     Activity    Your activity upon discharge: activity as tolerated     No CPR- Do NOT Intubate     Diet    Follow this diet upon discharge: Orders Placed This Encounter      Regular Diet Adult       Significant Results and Procedures   Most Recent 3 CBC's:  Recent Labs   Lab Test 10/20/20  0538 10/19/20  1500   WBC 9.6 11.7*   HGB 12.9* 14.3   MCV 90 91    310     Most Recent 3 BMP's:  Recent Labs   Lab Test 10/20/20  0538 10/19/20  1500    139   POTASSIUM 3.8 4.1   CHLORIDE 109 104   CO2 27 30   BUN 36* 36*   CR 1.30* 1.58*   ANIONGAP 6 5   JAZLYN 8.1* 8.5   GLC 99 109*     Most Recent 2 LFT's:  Recent Labs   Lab Test 10/19/20  1500   AST 15   ALT 12   ALKPHOS 67   BILITOTAL 0.4     Most Recent 3 INR's:  Recent Labs   Lab Test 10/19/20  1500   INR 1.08     Most Recent 3 Troponin's:  Recent Labs   Lab Test 10/19/20  1500   TROPI <0.015     Most Recent D-dimer:  Recent Labs   Lab Test 10/19/20  1500   DD 0.5     Most Recent 6 Bacteria Isolates From Any Culture (See EPIC Reports for Culture Details):No lab results found.  Most Recent TSH and T4:  Recent Labs   Lab Test 10/19/20  1500   TSH 3.45   ,   Results for orders placed or performed during the hospital encounter of 10/19/20   XR Chest Port 1 View    Narrative    PROCEDURE:  XR CHEST PORT 1 VW    HISTORY:  Shortness of breath with SVT.     COMPARISON:  None.    FINDINGS:   The cardiac silhouette is normal in size. The pulmonary vasculature is  normal.  There is abnormal increased density in the left lung base  consistent with atelectasis or pneumonia. Some minimal linear opacity  at the right lung base representing atelectasis or scarring No pleural  effusion or pneumothorax.      Impression    IMPRESSION:  Left basilar opacity consistent with atelectasis or  pneumonia       THELMA JACKSON MD       Discharge Medications   Current Discharge Medication List      START taking these medications    Details   diltiazem ER (CARDIZEM SR) 60 MG 12 hr capsule Take 1 capsule (60 mg) by mouth 2 times daily  Qty: 60 capsule, Refills: 3    Associated Diagnoses: Atrial fibrillation with RVR (H)         CONTINUE these medications which have NOT CHANGED    Details   donepezil (ARICEPT) 5 MG tablet Take 5 mg by mouth At Bedtime      furosemide (LASIX) 40 MG tablet Take 40 mg by mouth daily      levothyroxine (SYNTHROID/LEVOTHROID) 25 MCG tablet Take 25 mcg by mouth daily      lisinopril (ZESTRIL) 20 MG tablet Take 20 mg by mouth daily      acetaminophen (TYLENOL) 325 MG tablet Take 325 mg by mouth every 4 hours as needed Up to 4 times per day         STOP taking these medications       metoprolol succinate ER (TOPROL-XL) 25 MG 24 hr tablet Comments:   Reason for Stopping:             Allergies   No Known Allergies

## 2020-10-20 NOTE — PLAN OF CARE
Reason for hospital stay:  A-Fib w/ RVR    Living situation PTA: Assisted Living - [facility]    Most recent vitals: /74   Pulse 67   Temp 98.1  F (36.7  C) (Tympanic)   Resp 20   SpO2 96%     Pain Management:  Denied any pain    LOC:  Oriented to self only. Forgetful and impulsive - doesn't use call light.     Cardiac:  Tele reading SR 70-80s with frequent PACs per ICU nurse    Respiratory:  Lungs coarse with inspiratory and expiratory wheezes throughout. Occasional dry cough. Maintaining sats 96% on RA.     GI:  WDL - Had formed BM this evening.     :  WDL    Skin Issues:  Scattered bruising, blanchable red area to mid spine.     IVF:  SL    ABX:  N/A    Ambulation: Standby assist with gait belt.     Safety:  Alarms on - room across from nurses station      10/19/2020  10:36 PM  Jose Miguel Casiano RN

## 2020-10-20 NOTE — PLAN OF CARE
Patient discharged at 1:58 PM via wheel chair accompanied by staff. Prescriptions sent with patient to fill per nursing home to arrange . All belongings sent with patient.     Discharge instructions reviewed with Tucker HUFF. Listed belongings gathered and returned to patient. yes    Patient discharged to [facility] assisted living facilty.   Report called to Nursing Home:   [facility], spoke with Tucker HUFF    Core Measures and Vaccines  Core Measures applicable during stay: No. If yes, state diagnosis: N/A  Pneumonia and Influenza given prior to discharge, if indicated: Yes influenza    Surgical Patient   Surgical Procedures during stay: N/A  Did patient receive discharge instruction on wound care and recognition of infection symptoms? N/A    MISC  Follow up appointment made:  for nursing home to arrange  Home and hospital aquired medications returned to patient: N/A  Patient reports pain was well managed at discharge: Yes

## 2020-10-21 ENCOUNTER — TELEPHONE (OUTPATIENT)
Dept: CASE MANAGEMENT | Facility: HOSPITAL | Age: 85
End: 2020-10-21

## 2020-10-21 LAB
SARS-COV-2 RNA SPEC QL NAA+PROBE: NOT DETECTED
SPECIMEN SOURCE: NORMAL

## 2020-10-21 NOTE — PROGRESS NOTES
Name: Issac Sanchez    MRN#: [id number]    Reason for Hospitalization: SVT (supraventricular tachycardia) (H) [I47.1]  Atrial fibrillation with RVR (H) [I48.91]    Discharge Date: 10/20/2020    Patient / Family response to discharge plan: in agreement    Follow-Up Appt: No future appointments.    Other Providers (Care Coordinator, County Services, PCA services etc): Yes: [facility] and [facility]    Discharge Disposition: assisted living, [facility]    Miladys Albert CM

## 2020-10-28 ENCOUNTER — TELEPHONE (OUTPATIENT)
Dept: CASE MANAGEMENT | Facility: HOSPITAL | Age: 85
End: 2020-10-28

## 2020-10-28 NOTE — TELEPHONE ENCOUNTER
Issac Sanchez, was discharged to [facility] on  10/20/2020 from [facility]. I spoke today with Tucker HUFF regarding the patient's discharge.   He indicates he has receive(d) sufficient information upon discharge. Medications were reviewed in full on discharge, including: Medications to be started; medications to be stopped; medications to be continued from preadmission and any side effects. Prescriptions were received at discharge and were able to be filled. Medications are being taken as prescribed.   He indicates they have an appointment scheduled to see the facility provider.   He did not have any questions regarding discharge instructions or condition.  He was informed they may receive a survey in the mail from the hospital. Asked if they would kindly complete the survey in order for [facility] to know if services fully met patient needs.